# Patient Record
Sex: FEMALE | Race: OTHER | URBAN - METROPOLITAN AREA
[De-identification: names, ages, dates, MRNs, and addresses within clinical notes are randomized per-mention and may not be internally consistent; named-entity substitution may affect disease eponyms.]

---

## 2023-07-26 ENCOUNTER — TELEPHONE (OUTPATIENT)
Age: 23
End: 2023-07-26

## 2023-07-26 NOTE — TELEPHONE ENCOUNTER
Attempted to reach patient today to discuss her upcoming visit with Dr. Vera Bryan. She is scheduled for a visit for birth control. Trying to find out what kind of birth control she is interested. Is she currently on birth control? Changing birth control? Wanting an IUD? Nexplanon?

## 2024-02-26 ENCOUNTER — TELEPHONE (OUTPATIENT)
Age: 24
End: 2024-02-26

## 2024-02-26 NOTE — TELEPHONE ENCOUNTER
Patient calling the [facility] program requesting an appointment for a right breast lump. Per patient she first noticed it about 8 years ago when breastfeeding her 1st child. Patient is currently breastfeeding and continues to feel lump. Per patient she does not believe that the lump has increased in size, but has experienced 2 episodes of sharp pains in the lump, pt has had this lump checked by a provider in the past, years ago and was told it was a \"fat cyst\" but did not have imaging. Pt does not have any other breast concerns. Pt goes for birth control to a clinic where she pays out of packet but does not have a PCP. Per breast concern policy and protocol, I Instructed the patient to please have the lump reevaluated with a medical provider [facility]. Information provided. I also provided our fax number in case she goes to a different clinic so that they can send us the referral if needed. . Constance Rapp RN